# Patient Record
Sex: FEMALE | Race: WHITE | NOT HISPANIC OR LATINO | Employment: FULL TIME | URBAN - METROPOLITAN AREA
[De-identification: names, ages, dates, MRNs, and addresses within clinical notes are randomized per-mention and may not be internally consistent; named-entity substitution may affect disease eponyms.]

---

## 2017-01-23 ENCOUNTER — TELEPHONE (OUTPATIENT)
Dept: INTERNAL MEDICINE | Facility: CLINIC | Age: 62
End: 2017-01-23

## 2017-01-23 NOTE — TELEPHONE ENCOUNTER
----- Message from Mario Sanchez sent at 1/23/2017 12:50 PM CST -----  The patient is requesting that an antibiotic be pleased for the pt or an appt be scheduled.  The pt would be available to be seen 1/24 or 1/25 after 3pm  The pt can be reached at [phone]

## 2017-01-23 NOTE — TELEPHONE ENCOUNTER
"S/w pt. She is requesting "someone call in antibiotic. I get this every year. My body is just tired.".  Advised that pt would need to be evaluated in office for antibiotic. Verbalized understanding. Scheduled appt with Mrs. Aden on 01/24/17. /RONN  "

## 2017-03-07 ENCOUNTER — TELEPHONE (OUTPATIENT)
Dept: OPHTHALMOLOGY | Facility: CLINIC | Age: 62
End: 2017-03-07

## 2017-03-07 NOTE — TELEPHONE ENCOUNTER
----- Message from Radha Little sent at 3/7/2017  9:44 AM CST -----  Contact: pt  Pt is requesting to speak with nurse. Pt stated she lost prescription for glasses. Pt is request a copy. Pls call pt back at [phone]

## 2017-06-30 ENCOUNTER — OFFICE VISIT (OUTPATIENT)
Dept: PULMONOLOGY | Facility: CLINIC | Age: 62
End: 2017-06-30
Payer: COMMERCIAL

## 2017-06-30 ENCOUNTER — TELEPHONE (OUTPATIENT)
Dept: PULMONOLOGY | Facility: CLINIC | Age: 62
End: 2017-06-30

## 2017-06-30 VITALS
DIASTOLIC BLOOD PRESSURE: 70 MMHG | OXYGEN SATURATION: 98 % | RESPIRATION RATE: 18 BRPM | SYSTOLIC BLOOD PRESSURE: 110 MMHG | HEIGHT: 67 IN | WEIGHT: 159.5 LBS | HEART RATE: 72 BPM | BODY MASS INDEX: 25.03 KG/M2

## 2017-06-30 DIAGNOSIS — G47.19 EXCESSIVE DAYTIME SLEEPINESS: ICD-10-CM

## 2017-06-30 DIAGNOSIS — Z91.89 AT RISK FOR SLEEP APNEA: Primary | ICD-10-CM

## 2017-06-30 DIAGNOSIS — R53.83 FATIGUE, UNSPECIFIED TYPE: ICD-10-CM

## 2017-06-30 DIAGNOSIS — R06.83 SNORING: ICD-10-CM

## 2017-06-30 PROCEDURE — 99999 PR PBB SHADOW E&M-EST. PATIENT-LVL IV: CPT | Mod: PBBFAC,,, | Performed by: NURSE PRACTITIONER

## 2017-06-30 PROCEDURE — 99203 OFFICE O/P NEW LOW 30 MIN: CPT | Mod: S$GLB,,, | Performed by: NURSE PRACTITIONER

## 2017-06-30 NOTE — TELEPHONE ENCOUNTER
----- Message from Vladimir Marx sent at 6/30/2017  1:59 PM CDT -----  [facility]

## 2017-06-30 NOTE — PATIENT INSTRUCTIONS
Monitoring Your Sleep: Home Sleep Study  Your provider has scheduled you for a sleep study.   You should be receiving a phone call from the sleep lab shortly after your study has been approved by your insurance. Please make sure you have your current phone numbers in the [facility] system. If you do not hear from anyone in the next 10 -14 business days, please call the sleep lab at [phone] to schedule your sleep study.  This follow up appointment will be 10-14 days after your sleep study to review the results. If it is noted that you do have sleep apnea on your initial sleep study, you may receive a call back for a second night study with the CPAP before you come back to the office.    A home sleep study tracks and records body functions while youre asleep in your own bed. The results of the study will help diagnose your sleep problem and plan your treatment.    How a home sleep study works  During a sleep study, sensors attached to your body measure your breathing, oxygen level, and other body functions. You will be shown how to attach the sensors to your body. You may also have help from a technician. At bedtime you plug the sensors into a small computer and turn it on. In the morning, you will remove the sensors and return the computer so the results can be studied.  Tips  Youll be given instructions for how to set up the sensors and the computer. Doing so will be simple. For best results:  · Go through the instructions during the day so youll be ready to use the equipment at bedtime.  · Stick to your normal routine. Ask your healthcare provider if you should do anything differently the night of the study.  · If you get up during the night, reconnect the sensors to the computer or to yourself correctly.  · Get as many hours of sleep as you can.  Getting the results  The results of your sleep study need to be scored and interpreted. Once this is done, your healthcare provider will discuss the findings with  you. The sleep study results will show whether you have apnea. This is when your breathing stops temporarily many times during the night, awakening you briefly.  It can also tell how severe the apnea is. The findings help your healthcare provider know which treatment or treatments may be the right ones for you.  Date Last Reviewed: 8/9/2015  © 1626-7234 [employer]. [address] [place] [address]. All rights reserved. This information is not intended as a substitute for professional medical care. Always follow your healthcare professional's instructions.

## 2017-06-30 NOTE — PROGRESS NOTES
Subjective:      Patient ID: Angelica Rose is a 61 y.o. female.    she has been referred by Self, Jak for evaluation and management for   Chief Complaint   Patient presents with    Sleep Apnea     HPI:  She presents for a sleep evaluation related to snoring and low energy, occasions of awakening gasping over the past 2 years.   Patient has observed restless sleep.   Patient reports non restful' sleep.  She reports morning headache.   Shedenies impairment of activity during wakefulness but feels she is dragging.  She reports day time napping; duration up to 3 hrs if given opportunity on Sunday.   [place] sleepiness score was 15.  Neck circumference is 34 cm. (13 1/4 inches).  Mallampati score 3  She denies recent weight gain.  Cardiovascular risk factors: none.  Bed time is 0730 - 0800  Wake time is 0230 M-Th x 30 years awakening at 0230 for work. She has to be at work about 4:15am. 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   On Friday-Sunday bedtime 0800, and awake time is 5-6 am.   Sleep onset is within  15 Minutes.  Sleep maintenance difficulties related to frequent night time awakening and non-restful sleep  Wake after sleep onset occurs three times a night.  Nocturia occurs three times a night,   Sleep aids : No  Dry mouth : Yes,   Sleep walking: No,   Sleep talking : No,   Sleep eating:No  Vivid Dreams : No,   Cataplexy : No,   Hypnogogic hallucinations:  No  Has been told she sleeps with her eyes open.  Caffeine: 3 cups in morning.  Bruxism: yes, has a splint from dentist, wears sporadically.  Restless leg: no    STOP - BANG Questionnaire:      1. Snoring : Do you snore loudly ?    Yes  2. Tired : Do you often feel tired, fatigued, or sleepy during daytime?   Yes  3. Observed: Has anyone observed you stop breathing during your sleep?    No   4. Blood pressure : Do you have or are you being treated for high blood pressure?   No  5. BMI :BMI more than 35  kg/m2?   No  6. Age : Age over 50 yr old?   Yes  7. Neck circumference: Neck circumference greater than 40 cm?   No  8. Gender: Gender male?   No    SCORE: 3    High risk of JESUS: Yes  5 - 8  Intermediate risk of JESUS: Yes 3 - 4  Low risk of JESUS: Yes 0 - 2    Previous Report Reviewed: lab reports and office notes     Past Medical History: The following portions of the patient's history were reviewed and updated as appropriate:   She  has a past surgical history that includes Total abdominal hysterectomy w/ bilateral salpingoophorectomy; Hysterectomy; and Refractive surgery.  Her family history includes Cancer in her mother; Cataracts in her brother and mother; Coronary artery disease in her brother; Diabetes in her brother, maternal grandfather, and paternal grandmother; Glaucoma in her mother; No Known Problems in her daughter and daughter.  She  reports that she has quit smoking. Her smoking use included Cigarettes. She has never used smokeless tobacco. She reports that she does not drink alcohol or use drugs.  She has a current medication list which includes the following prescription(s): meloxicam, ascorbic acid (vitamin c), cholecalciferol (vitamin d3), cyclosporine, fish oil-omega-3 fatty acids, hydrocodone-acetaminophen 5-325mg, magnesium, mv-mn/folic acid/calcium/vit k, and zinc gluconate.  She has No Known Allergies..    Review of Systems   Constitutional: Positive for fatigue. Negative for fever, chills, weight loss, weight gain, activity change, appetite change and night sweats.   HENT: Negative for postnasal drip, rhinorrhea, sinus pressure, voice change and congestion.    Eyes: Negative for redness and itching.   Respiratory: Positive for snoring. Negative for apnea, cough, sputum production, chest tightness, shortness of breath, wheezing, orthopnea, asthma nighttime symptoms, dyspnea on extertion, use of rescue inhaler and somnolence.    Cardiovascular: Negative.  Negative for chest pain, palpitations  "and leg swelling.   Genitourinary: Negative for difficulty urinating and hematuria.   Endocrine: Negative for polydipsia, polyphagia, cold intolerance, heat intolerance and polyuria.    Musculoskeletal: Negative.  Negative for arthralgias, back pain, gait problem, joint swelling and myalgias.   Skin: Negative.    Gastrointestinal: Positive for acid reflux ( first occurence once this past week, awoke with gerd, she thinks lemon water. none since stopping lemon water). Negative for abdominal pain.   Neurological: Negative for dizziness, weakness, light-headedness and headaches.   Hematological: Negative for adenopathy. No excessive bruising.   Psychiatric/Behavioral: Negative for sleep disturbance. The patient is not nervous/anxious.    All other systems reviewed and are negative.       Objective:     Physical Exam   Constitutional: She is oriented to person, place, and time. Vital signs are normal. She appears well-developed and well-nourished. She is active and cooperative.  Non-toxic appearance. She does not appear ill. No distress.   HENT:   Head: Normocephalic.   Right Ear: External ear normal.   Left Ear: External ear normal.   Nose: Nose normal.   Mouth/Throat: Oropharynx is clear and moist. No oropharyngeal exudate.   Eyes: Conjunctivae are normal.   Neck: Normal range of motion. Neck supple.   Cardiovascular: Normal rate, regular rhythm, normal heart sounds and intact distal pulses.    Pulmonary/Chest: Effort normal and breath sounds normal.   Abdominal: Soft. Bowel sounds are normal.   Musculoskeletal: Normal range of motion.   Neurological: She is alert and oriented to person, place, and time.   Skin: Skin is warm and dry.   Psychiatric: She has a normal mood and affect. Her behavior is normal. Judgment and thought content normal.   Vitals reviewed.    Vitals:    06/30/17 0808   BP: 110/70   Pulse: 72   Resp: 18   SpO2: 98%   Weight: 72.3 kg (159 lb 8 oz)   Height: 5' 7.2" (1.707 m)     Body mass index is " 24.83 kg/m².    Personal Diagnostic Review  Reviewed lab within normal limits     Assessment:     1. At risk for sleep apnea    2. Excessive daytime sleepiness    3. Fatigue, unspecified type    4. Snoring      Orders Placed This Encounter   Procedures    Home Sleep Studies     Standing Status:   Future     Standing Expiration Date:   6/30/2018     Plan:     Discussed diagnosis, its evaluation, treatment and usual course. All questions answered.    At risk for sleep apnea  Comments:  snoring, awakening gasping, snoring, daytime sleepiness and daytime fatigue.   Orders:  -     Home Sleep Studies; Future    Excessive daytime sleepiness  -     Home Sleep Studies; Future    Fatigue, unspecified type  -     Home Sleep Studies; Future    Snoring  -     Home Sleep Studies; Future       Return for Sleep Study Follow Up.

## 2017-07-06 ENCOUNTER — TELEPHONE (OUTPATIENT)
Dept: PULMONOLOGY | Facility: HOSPITAL | Age: 62
End: 2017-07-06

## 2017-07-07 ENCOUNTER — TELEPHONE (OUTPATIENT)
Dept: PULMONOLOGY | Facility: CLINIC | Age: 62
End: 2017-07-07

## 2017-07-07 NOTE — TELEPHONE ENCOUNTER
----- Message from Vladimir Marx sent at 7/7/2017  1:58 PM CDT -----  Pt ask that home study be cancelled.

## 2018-05-23 ENCOUNTER — OFFICE VISIT (OUTPATIENT)
Dept: OPHTHALMOLOGY | Facility: CLINIC | Age: 63
End: 2018-05-23
Payer: COMMERCIAL

## 2018-05-23 DIAGNOSIS — H52.7 REFRACTION DISORDER: ICD-10-CM

## 2018-05-23 DIAGNOSIS — H04.123 DRY EYE SYNDROME, BILATERAL: ICD-10-CM

## 2018-05-23 DIAGNOSIS — H40.1131 PRIMARY OPEN-ANGLE GLAUCOMA, BILATERAL, MILD STAGE: Primary | ICD-10-CM

## 2018-05-23 PROCEDURE — 92014 COMPRE OPH EXAM EST PT 1/>: CPT | Mod: S$GLB,,, | Performed by: OPHTHALMOLOGY

## 2018-05-23 PROCEDURE — 92133 CPTRZD OPH DX IMG PST SGM ON: CPT | Mod: S$GLB,,, | Performed by: OPHTHALMOLOGY

## 2018-05-23 PROCEDURE — 99999 PR PBB SHADOW E&M-EST. PATIENT-LVL II: CPT | Mod: PBBFAC,,, | Performed by: OPHTHALMOLOGY

## 2018-05-23 RX ORDER — ESCITALOPRAM OXALATE 10 MG/1
10 TABLET ORAL DAILY
COMMUNITY

## 2018-05-23 RX ORDER — KETOROLAC TROMETHAMINE 5 MG/ML
1 SOLUTION OPHTHALMIC 4 TIMES DAILY
Qty: 5 ML | Refills: 1 | Status: SHIPPED | OUTPATIENT
Start: 2018-05-23 | End: 2018-06-29 | Stop reason: SDUPTHER

## 2018-05-23 NOTE — PROGRESS NOTES
HPI     Patient was last seen in 2016 she returns for her exam, patient c/o   blurred vision at a distance .    Dry Eyes : not using any treatment other than OTC drops in the am - can't   remember the name    Last edited by Frank Sunshine MD on 5/23/2018 10:50 AM. (History)            Assessment /Plan     For exam results, see Encounter Report.      ICD-10-CM ICD-9-CM    1. Primary open-angle glaucoma, bilateral, mild stage H40.1131 365.11 Posterior Segment OCT Optic Nerve- Both eyes   IOP not within acceptable range relative to target IOP with risk of irreversible visual loss. Additional treatment required.  Discussed options, risks, and benefits of additional medication, SLT laser, or incisional glaucoma surgery.     recommend SLT OU     Patient chooses SLT OU     Reviewed importance of continued compliance with treatment and follow up.        365.71    2. Dry eye syndrome, bilateral H04.123 375.15 Findings and symptoms consistent with advanced dry eyes. Dry eye instruction sheet provided. Options discussed including the use of punctal plugs, Gardner therapy, Restasis, and the use of preservative free products, as well as to use conservative management.     Patient elects to use :  PURE EYES CLEAR RELIEF TEARS 4 DAILY   Genteal Gel at bedtime  Start Nordic Natural caps by mouth                3. Refraction disorder H52.7 367.9          Will need HVF after SLT

## 2018-06-29 DIAGNOSIS — H40.1131 PRIMARY OPEN-ANGLE GLAUCOMA, BILATERAL, MILD STAGE: ICD-10-CM

## 2018-06-29 RX ORDER — KETOROLAC TROMETHAMINE 5 MG/ML
SOLUTION OPHTHALMIC
Qty: 5 ML | Refills: 6 | Status: SHIPPED | OUTPATIENT
Start: 2018-06-29

## 2018-07-03 ENCOUNTER — OFFICE VISIT (OUTPATIENT)
Dept: OPHTHALMOLOGY | Facility: CLINIC | Age: 63
End: 2018-07-03
Payer: COMMERCIAL

## 2018-07-03 DIAGNOSIS — H40.1131 PRIMARY OPEN-ANGLE GLAUCOMA, BILATERAL, MILD STAGE: Primary | ICD-10-CM

## 2018-07-03 DIAGNOSIS — H04.123 DRY EYE SYNDROME, BILATERAL: ICD-10-CM

## 2018-07-03 PROCEDURE — 99999 PR PBB SHADOW E&M-EST. PATIENT-LVL II: CPT | Mod: PBBFAC,,, | Performed by: OPHTHALMOLOGY

## 2018-07-03 PROCEDURE — 92012 INTRM OPH EXAM EST PATIENT: CPT | Mod: S$GLB,,, | Performed by: OPHTHALMOLOGY

## 2018-07-03 NOTE — PROGRESS NOTES
HPI     Glaucoma    Additional comments: Ketorolac qd OU, Pure Eyes Clear Relief TID OU,   Nordic Natural 2 capsules in the AM           Comments   3 weeks post SLT OU (IOP check and dry eye check)  Patient states that she suffers with severe dryness.  Patient states that she has fluid under her eyes.    1. Lasik OU 2005  2. Dry Eye OU  3. COAG Suspect  SLT OU 6-7-18  4. H/o contact lens wearer    Ketorolac qd OU  Pure Eyes Clear Relief TID OU  Nordic Natural 2 capsules in the am       Last edited by Graciela Hogan on 7/3/2018  9:38 AM. (History)            Assessment /Plan     For exam results, see Encounter Report.      ICD-10-CM ICD-9-CM    1. Primary open-angle glaucoma, bilateral, mild stage H40.1131 365.11 IOP better since SLT Laser OU  6/4/18   Stop Ketorolac today      365.71    2. Dry eye syndrome, bilateral H04.123 375.15 Continue Pure Eye Tears and consider Gel at bedtime   Sxs may improve after stopping Keto        RETURN TO CLINIC 3 month IOP, HVF     Pure Eyes Clear Relief TID OU  Nordic Natural 2 capsules in the am